# Patient Record
(demographics unavailable — no encounter records)

---

## 2025-05-22 NOTE — HISTORY OF PRESENT ILLNESS
[de-identified] : 6 year old male  (  )   right shoulder pain since fall at recess onto shouldr on 5/16/25, wrnt to [facility] and had xray The pain is located  deep The pain is associated with discomfrot   Worse with activity and better at rest. Has tried sling, activity mod

## 2025-05-22 NOTE — IMAGING
[de-identified] :  RIGHT /SHOULDER Inspection: No swelling. Mild Scapular Protraction. Palpation: No Tenderness is noted  Range of motion:  , ER 60, @90ER 90, @90IR 50 Strength: Forward Flexion 5/5. Abduction 5/5.  External Rotation 5/5 and Internal Rotation 5/5 Neurological testing: motor and sensor intact distally. Ligament Stability and Special Tests:  Shoulder apprehension: neg Shoulder relocation: neg Obriens test: neg Biceps Active test: neg Felix Labral Shear: neg Impingement testing: neg Xiomara testing: neg Cross Body Adduction: neg

## 2025-05-22 NOTE — ASSESSMENT
[FreeTextEntry1] : notes from  [facility] was reviewed and independently interpreted xray right shoulder 5/16/25 - no obvous fx, open grpath plates     - We discussed their diagnosis and treatment options at length including the risks and benefits of both surgical and non-surgical options. Surgical risks include but are not limited to pain, infection, bleeding, vascular injury, numbness, tingling, nerve damage. - they will continue conservative treatment with PT, icing, and anti-inflammatory medications - The patient was provided with a prescription to work on scapular strengthening and rotator cuff strengthening. - The patient was advised to let pain guide the gradual advancement of activities. - Follow up as needed